# Patient Record
Sex: MALE | Race: WHITE | Employment: FULL TIME | URBAN - METROPOLITAN AREA
[De-identification: names, ages, dates, MRNs, and addresses within clinical notes are randomized per-mention and may not be internally consistent; named-entity substitution may affect disease eponyms.]

---

## 2020-09-03 ENCOUNTER — APPOINTMENT (OUTPATIENT)
Dept: CT IMAGING | Age: 30
End: 2020-09-03

## 2020-09-03 ENCOUNTER — HOSPITAL ENCOUNTER (EMERGENCY)
Age: 30
Discharge: HOME | End: 2020-09-03
Attending: EMERGENCY MEDICINE

## 2020-09-03 ENCOUNTER — APPOINTMENT (OUTPATIENT)
Dept: ULTRASOUND IMAGING | Age: 30
End: 2020-09-03

## 2020-09-03 VITALS
TEMPERATURE: 98.4 F | WEIGHT: 157 LBS | HEART RATE: 75 BPM | RESPIRATION RATE: 16 BRPM | OXYGEN SATURATION: 98 % | HEIGHT: 72 IN | SYSTOLIC BLOOD PRESSURE: 124 MMHG | DIASTOLIC BLOOD PRESSURE: 76 MMHG | BODY MASS INDEX: 21.26 KG/M2

## 2020-09-03 LAB
ALBUMIN SERPL-MCNC: 4.2 G/DL (ref 3.5–5.2)
ALP BLD-CCNC: 41 U/L (ref 40–129)
ALT SERPL-CCNC: 15 U/L (ref 0–40)
ANION GAP SERPL CALCULATED.3IONS-SCNC: 8 MMOL/L (ref 7–16)
AST SERPL-CCNC: 18 U/L (ref 0–39)
BASOPHILS ABSOLUTE: 0.08 E9/L (ref 0–0.2)
BASOPHILS RELATIVE PERCENT: 1.2 % (ref 0–2)
BILIRUB SERPL-MCNC: 0.4 MG/DL (ref 0–1.2)
BILIRUBIN URINE: NEGATIVE
BLOOD, URINE: NEGATIVE
BUN BLDV-MCNC: 20 MG/DL (ref 6–20)
CALCIUM SERPL-MCNC: 9.5 MG/DL (ref 8.6–10.2)
CHLORIDE BLD-SCNC: 100 MMOL/L (ref 98–107)
CLARITY: CLEAR
CO2: 28 MMOL/L (ref 22–29)
COLOR: YELLOW
CREAT SERPL-MCNC: 0.9 MG/DL (ref 0.7–1.2)
EOSINOPHILS ABSOLUTE: 0.15 E9/L (ref 0.05–0.5)
EOSINOPHILS RELATIVE PERCENT: 2.3 % (ref 0–6)
GFR AFRICAN AMERICAN: >60
GFR NON-AFRICAN AMERICAN: >60 ML/MIN/1.73
GLUCOSE BLD-MCNC: 96 MG/DL (ref 74–99)
GLUCOSE URINE: NEGATIVE MG/DL
HCT VFR BLD CALC: 39.4 % (ref 37–54)
HEMOGLOBIN: 13.1 G/DL (ref 12.5–16.5)
IMMATURE GRANULOCYTES #: 0.02 E9/L
IMMATURE GRANULOCYTES %: 0.3 % (ref 0–5)
KETONES, URINE: ABNORMAL MG/DL
LACTIC ACID: 0.6 MMOL/L (ref 0.5–2.2)
LEUKOCYTE ESTERASE, URINE: NEGATIVE
LIPASE: 33 U/L (ref 13–60)
LYMPHOCYTES ABSOLUTE: 1.37 E9/L (ref 1.5–4)
LYMPHOCYTES RELATIVE PERCENT: 21.3 % (ref 20–42)
MCH RBC QN AUTO: 30.3 PG (ref 26–35)
MCHC RBC AUTO-ENTMCNC: 33.2 % (ref 32–34.5)
MCV RBC AUTO: 91 FL (ref 80–99.9)
MONOCYTES ABSOLUTE: 0.47 E9/L (ref 0.1–0.95)
MONOCYTES RELATIVE PERCENT: 7.3 % (ref 2–12)
NEUTROPHILS ABSOLUTE: 4.35 E9/L (ref 1.8–7.3)
NEUTROPHILS RELATIVE PERCENT: 67.6 % (ref 43–80)
NITRITE, URINE: NEGATIVE
PDW BLD-RTO: 12.6 FL (ref 11.5–15)
PH UA: 6 (ref 5–9)
PLATELET # BLD: 144 E9/L (ref 130–450)
PMV BLD AUTO: 11.5 FL (ref 7–12)
POTASSIUM REFLEX MAGNESIUM: 4.3 MMOL/L (ref 3.5–5)
PROTEIN UA: NEGATIVE MG/DL
RBC # BLD: 4.33 E12/L (ref 3.8–5.8)
SODIUM BLD-SCNC: 136 MMOL/L (ref 132–146)
SPECIFIC GRAVITY UA: 1.02 (ref 1–1.03)
TOTAL PROTEIN: 6.7 G/DL (ref 6.4–8.3)
UROBILINOGEN, URINE: 0.2 E.U./DL
WBC # BLD: 6.4 E9/L (ref 4.5–11.5)

## 2020-09-03 PROCEDURE — 96374 THER/PROPH/DIAG INJ IV PUSH: CPT

## 2020-09-03 PROCEDURE — 80053 COMPREHEN METABOLIC PANEL: CPT

## 2020-09-03 PROCEDURE — 6360000002 HC RX W HCPCS: Performed by: EMERGENCY MEDICINE

## 2020-09-03 PROCEDURE — 83690 ASSAY OF LIPASE: CPT

## 2020-09-03 PROCEDURE — 83605 ASSAY OF LACTIC ACID: CPT

## 2020-09-03 PROCEDURE — 99284 EMERGENCY DEPT VISIT MOD MDM: CPT

## 2020-09-03 PROCEDURE — 85025 COMPLETE CBC W/AUTO DIFF WBC: CPT

## 2020-09-03 PROCEDURE — 99283 EMERGENCY DEPT VISIT LOW MDM: CPT

## 2020-09-03 PROCEDURE — 93975 VASCULAR STUDY: CPT

## 2020-09-03 PROCEDURE — 76870 US EXAM SCROTUM: CPT

## 2020-09-03 PROCEDURE — 81003 URINALYSIS AUTO W/O SCOPE: CPT

## 2020-09-03 PROCEDURE — 74177 CT ABD & PELVIS W/CONTRAST: CPT

## 2020-09-03 PROCEDURE — 6370000000 HC RX 637 (ALT 250 FOR IP): Performed by: EMERGENCY MEDICINE

## 2020-09-03 PROCEDURE — 6360000004 HC RX CONTRAST MEDICATION: Performed by: RADIOLOGY

## 2020-09-03 RX ORDER — KETOROLAC TROMETHAMINE 30 MG/ML
15 INJECTION, SOLUTION INTRAMUSCULAR; INTRAVENOUS ONCE
Status: COMPLETED | OUTPATIENT
Start: 2020-09-03 | End: 2020-09-03

## 2020-09-03 RX ORDER — HYDROCODONE BITARTRATE AND ACETAMINOPHEN 5; 325 MG/1; MG/1
1 TABLET ORAL ONCE
Status: COMPLETED | OUTPATIENT
Start: 2020-09-03 | End: 2020-09-03

## 2020-09-03 RX ADMIN — HYDROCODONE BITARTRATE AND ACETAMINOPHEN 1 TABLET: 5; 325 TABLET ORAL at 19:44

## 2020-09-03 RX ADMIN — IOPAMIDOL 110 ML: 755 INJECTION, SOLUTION INTRAVENOUS at 18:47

## 2020-09-03 RX ADMIN — KETOROLAC TROMETHAMINE 15 MG: 30 INJECTION, SOLUTION INTRAMUSCULAR at 17:44

## 2020-09-03 ASSESSMENT — PAIN DESCRIPTION - PAIN TYPE: TYPE: ACUTE PAIN

## 2020-09-03 ASSESSMENT — PAIN SCALES - GENERAL
PAINLEVEL_OUTOF10: 5
PAINLEVEL_OUTOF10: 7
PAINLEVEL_OUTOF10: 7

## 2020-09-03 ASSESSMENT — PAIN DESCRIPTION - FREQUENCY: FREQUENCY: CONTINUOUS

## 2020-09-03 ASSESSMENT — PAIN DESCRIPTION - ONSET: ONSET: SUDDEN

## 2020-09-03 ASSESSMENT — PAIN DESCRIPTION - DESCRIPTORS: DESCRIPTORS: THROBBING

## 2020-09-03 ASSESSMENT — PAIN DESCRIPTION - LOCATION: LOCATION: SCROTUM

## 2020-09-03 ASSESSMENT — PAIN - FUNCTIONAL ASSESSMENT: PAIN_FUNCTIONAL_ASSESSMENT: PREVENTS OR INTERFERES SOME ACTIVE ACTIVITIES AND ADLS

## 2020-09-03 ASSESSMENT — PAIN DESCRIPTION - ORIENTATION: ORIENTATION: RIGHT

## 2020-09-03 NOTE — ED PROVIDER NOTES
Patient is a 58-year-old male with no significant past medical history presents emergency department with testicular pain. Symptoms moderate in severity and intermittent since onset. Patient noticed pain in his right testicle starting last night. He states the pain is intermittent and comes and sharp waves. He has never had pain like this before. The pain does not radiate anywhere, is localized to his right testicle. He saw his primary care provider today and they thought he had orchitis but sent him to the ER for further evaluation. Patient denies any fevers or chills at home. Denies any nausea, vomiting, diarrhea, constipation, abdominal pain. He denies any penile discharge or penile pain or swelling. He states he does feel like his right testicle is higher than the other and he had to keep \"pulling it down\". He notes some mild RLQ abdominal pain but denying it today. Deneis fevers, chills, diarrhea, constipation. Deneis hx of STD/STI, no discharge and state not currently sexually active. Review of Systems   Constitutional: Negative for chills and fever. Eyes: Negative for pain and discharge. Respiratory: Negative for cough, shortness of breath and wheezing. Cardiovascular: Negative for chest pain. Gastrointestinal: Positive for abdominal pain (yesterday). Negative for diarrhea, nausea and vomiting. Genitourinary: Positive for testicular pain. Negative for discharge, dysuria, frequency, penile swelling, scrotal swelling and urgency. Musculoskeletal: Negative for arthralgias and back pain. Skin: Negative for rash and wound. Neurological: Negative for weakness and headaches. All other systems reviewed and are negative. Physical Exam  Vitals signs and nursing note reviewed. Constitutional:       General: He is not in acute distress. Appearance: He is well-developed. He is not ill-appearing. HENT:      Head: Normocephalic and atraumatic.    Eyes:      Pupils: Pupils are equal, round, and reactive to light. Neck:      Musculoskeletal: Normal range of motion and neck supple. Cardiovascular:      Rate and Rhythm: Normal rate and regular rhythm. Heart sounds: Normal heart sounds. No murmur. Pulmonary:      Effort: Pulmonary effort is normal. No respiratory distress. Breath sounds: Normal breath sounds. No wheezing or rales. Abdominal:      General: Abdomen is flat. Palpations: Abdomen is soft. Tenderness: There is no abdominal tenderness. There is no right CVA tenderness, left CVA tenderness, guarding or rebound. Negative signs include Blancas's sign and Rovsing's sign. Hernia: No hernia is present. There is no hernia in the left inguinal area, right femoral area, left femoral area or right inguinal area. Genitourinary:     Penis: Normal.       Scrotum/Testes: Cremasteric reflex is present. Right: Mass, tenderness or swelling not present. Left: Tenderness present. Mass or swelling not present. Comments: Left testcle midlly TTP, no masses or abscess palpable. Skin unchanged, no erythema or induration. Right testicle is elevated when compared to left. No inguinal hernia or femoral hernias palpable. No pain to spermatic cord palpation  Skin:     General: Skin is warm and dry. Capillary Refill: Capillary refill takes less than 2 seconds. Neurological:      Mental Status: He is alert and oriented to person, place, and time. Coordination: Coordination normal.          Procedures     MDM  Number of Diagnoses or Management Options  Abdominal pain, right lower quadrant:   Ileitis:   Varicocele present on ultrasound of scrotum:    Patient presented to the ED for testicle pain. History concenring for torsion. US performed  Demonstrating a prominent varicocele and small amount of scrotal fluid. No evidence of paranchymal abnormality or vascularl compromise.  There was no fourneir or skin changes concerning for cellulitis or infection. Toradol made symptoms mildly better. No penile discharge and patient stating multiple times that he is not concerned for STD and not wanting treatment for possible infection. UA with trace leuks but no infection. On repeat eval, updated about ultrasound findings. Patient pain is improved and resting comfortably in bed. He does note some mild RLQ abdominal pain now. PE with mild tenderness, no peritoneal signs. Lab work then ordered as well as CT to r/o appendicitis. Lab work unremarkable. CT scan showing ileitis, and intraluminal fluid. The appendix could not be indentified. H&P less concerning appendicitis when coupled with lab work. Consult placed to general surgery to discuss case, less concern for appendicitis and strict return precautions and monitoring of pain recommended. Also instructed the need for follow up on the ileitis as this could be 2/2 IBD. Updated patient and mother about work up and findings. Informed that appendix could not be visualized so very close monitoring of pain needs to be performed. Also informed of the ileitis and importance of follow up. As I was waiting to Dr. Danay Baker, patient left the department but mother remained. She initially stated patient was not coming back for discharge paper work so we were going to [place] his as eloped/Left AMA but provide mother with the follow up information to amena to patient. As I was printing paperwork, patient return to the department and I was able to finished discussing all of the findings and completed education. Very strict return precautions were discussed. Patient is to follow up with PCP as well as general surgery or GI if they prefer to work up the ileitis. Extensive education about the testicular pain and expressed the need to return to the ED immediately if pain worsen or new symptoms occur. Patient verbalized understanding and felt comfortable with leaving and following up.  They verbalized understanding and were agreeable with plan. Patient was discharged. ED Course as of Sep 04 1319   Thu Sep 03, 2020   1730 Reevaluated patient, he does state he does have some very mild right lower quadrant on tenderness. He has no rebound or guarding on exam.  He has mild pain to deep palpation, will pursue lab work and imaging studies. []   1959 Patient's mother has been in the department. patient's entire stay, he stated at the beginning of the visit, she was able to be in the room during the entire evaluation, he felt comfortable speaking in front of her about his medical problems. I went to reevaluate patient after speaking with Dr. Mort Essex, patient has eloped from the emergency department. Mother still in the room. Mother states patient states he is not returning back into the ER. Patient has eloped from the department. Because patient allowed mother to be part of his medical care here in the department, I will provide the mother with the paperwork of patient's discharge instructions and appropriate follow-up. []   2003 Upon walking about the room, patient had return to the emergency department. Will provide patient with follow-up and information about lab work and studies performed here in the department. []      ED Course User Index  [] Carmencita Ngo, DO          --------------------------------------------- PAST HISTORY ---------------------------------------------  Past Medical History:  has a past medical history of Tourette's syndrome. Past Surgical History:  has a past surgical history that includes Arm Surgery. Social History:  reports that he has been smoking cigarettes. He has been smoking about 1.00 pack per day. He has never used smokeless tobacco. He reports current alcohol use. He reports current drug use. Drug: Marijuana. Family History: family history is not on file. The patients home medications have been reviewed.     Allergies: Acyclovir and Ceclor [cefaclor]    -------------------------------------------------- RESULTS -------------------------------------------------  Labs:  Results for orders placed or performed during the hospital encounter of 09/03/20   Urinalysis, reflex to microscopic   Result Value Ref Range    Color, UA Yellow Straw/Yellow    Clarity, UA Clear Clear    Glucose, Ur Negative Negative mg/dL    Bilirubin Urine Negative Negative    Ketones, Urine TRACE (A) Negative mg/dL    Specific Gravity, UA 1.025 1.005 - 1.030    Blood, Urine Negative Negative    pH, UA 6.0 5.0 - 9.0    Protein, UA Negative Negative mg/dL    Urobilinogen, Urine 0.2 <2.0 E.U./dL    Nitrite, Urine Negative Negative    Leukocyte Esterase, Urine Negative Negative   CBC Auto Differential   Result Value Ref Range    WBC 6.4 4.5 - 11.5 E9/L    RBC 4.33 3.80 - 5.80 E12/L    Hemoglobin 13.1 12.5 - 16.5 g/dL    Hematocrit 39.4 37.0 - 54.0 %    MCV 91.0 80.0 - 99.9 fL    MCH 30.3 26.0 - 35.0 pg    MCHC 33.2 32.0 - 34.5 %    RDW 12.6 11.5 - 15.0 fL    Platelets 953 358 - 884 E9/L    MPV 11.5 7.0 - 12.0 fL    Neutrophils % 67.6 43.0 - 80.0 %    Immature Granulocytes % 0.3 0.0 - 5.0 %    Lymphocytes % 21.3 20.0 - 42.0 %    Monocytes % 7.3 2.0 - 12.0 %    Eosinophils % 2.3 0.0 - 6.0 %    Basophils % 1.2 0.0 - 2.0 %    Neutrophils Absolute 4.35 1.80 - 7.30 E9/L    Immature Granulocytes # 0.02 E9/L    Lymphocytes Absolute 1.37 (L) 1.50 - 4.00 E9/L    Monocytes Absolute 0.47 0.10 - 0.95 E9/L    Eosinophils Absolute 0.15 0.05 - 0.50 E9/L    Basophils Absolute 0.08 0.00 - 0.20 E9/L   Comprehensive Metabolic Panel w/ Reflex to MG   Result Value Ref Range    Sodium 136 132 - 146 mmol/L    Potassium reflex Magnesium 4.3 3.5 - 5.0 mmol/L    Chloride 100 98 - 107 mmol/L    CO2 28 22 - 29 mmol/L    Anion Gap 8 7 - 16 mmol/L    Glucose 96 74 - 99 mg/dL    BUN 20 6 - 20 mg/dL    CREATININE 0.9 0.7 - 1.2 mg/dL    GFR Non-African American >60 >=60 mL/min/1.73    GFR African American >60     Calcium 9.5 8.6 - 10.2 mg/dL    Total Protein 6.7 6.4 - 8.3 g/dL    Alb 4.2 3.5 - 5.2 g/dL    Total Bilirubin 0.4 0.0 - 1.2 mg/dL    Alkaline Phosphatase 41 40 - 129 U/L    ALT 15 0 - 40 U/L    AST 18 0 - 39 U/L   Lipase   Result Value Ref Range    Lipase 33 13 - 60 U/L   Lactic Acid, Plasma   Result Value Ref Range    Lactic Acid 0.6 0.5 - 2.2 mmol/L       Radiology:  CT ABDOMEN PELVIS W IV CONTRAST Additional Contrast? None   Final Result   There is distal small bowel with intraluminal fluid in the pelvic and   right lower quadrant small bowel distribution, suggesting ileitis, but   the appearance is nonspecific and is not associated with extraluminal   inflammatory findings. The colon is minimally distended with fecal   content. The appendix is not identified, and early or mild appendicitis could   be missed. US DUP ABD PEL RETRO SCROT COMPLETE   Final Result   Prominent varicocele changes bilaterally and small amounts of scrotal   fluid bilaterally. No evidence of testicular parenchymal abnormality or vascular   compromise. US SCROTUM AND TESTICLES   Final Result   Prominent varicocele changes bilaterally and small amounts of scrotal   fluid bilaterally. No evidence of testicular parenchymal abnormality or vascular   compromise. Consultation:  I Spoke with Dr. Desiree Robb (Surgery). Discussed case. They will provide consultation.      ------------------------- NURSING NOTES AND VITALS REVIEWED ---------------------------  Date / Time Roomed:  9/3/2020  3:20 PM  ED Bed Assignment:  08/08    The nursing notes within the ED encounter and vital signs as below have been reviewed.    /76   Pulse 75   Temp 98.4 °F (36.9 °C) (Oral)   Resp 16   Ht 6' (1.829 m)   Wt 157 lb (71.2 kg)   SpO2 98%   BMI 21.29 kg/m²   Oxygen Saturation Interpretation: Normal      ------------------------------------------ PROGRESS NOTES ------------------------------------------  ED COURSE MEDICATIONS:                Medications   ketorolac (TORADOL) injection 15 mg (15 mg Intravenous Given 9/3/20 1744)   iopamidol (ISOVUE-370) 76 % injection 110 mL (110 mLs Intravenous Given 9/3/20 1847)   HYDROcodone-acetaminophen (NORCO) 5-325 MG per tablet 1 tablet (1 tablet Oral Given 9/3/20 1944)       I have spoken with the patient and mother and discussed todays results, in addition to providing specific details for the plan of care and counseling regarding the diagnosis and prognosis. Their questions are answered at this time and they are agreeable with the plan. I discussed at length with them reasons for immediate return here for re evaluation. They will followup with primary care by calling their office tomorrow. --------------------------------- ADDITIONAL PROVIDER NOTES ---------------------------------  At this time the patient is without objective evidence of an acute process requiring hospitalization or inpatient management. They have remained hemodynamically stable throughout their entire ED visit and are stable for discharge with outpatient follow-up. The plan has been discussed in detail and they are aware of the specific conditions for emergent return, as well as the importance of follow-up. Discharge Medication List as of 9/3/2020  8:03 PM          Diagnosis:  1. Varicocele present on ultrasound of scrotum    2. Ileitis    3. Abdominal pain, right lower quadrant        Disposition:  Patient's disposition: Discharge to home  Patient's condition is stable.        Guillermo Ngo,   Resident  09/04/20 2371

## 2020-09-04 ENCOUNTER — APPOINTMENT (OUTPATIENT)
Dept: ULTRASOUND IMAGING | Age: 30
End: 2020-09-04

## 2020-09-04 ENCOUNTER — HOSPITAL ENCOUNTER (EMERGENCY)
Age: 30
Discharge: HOME | End: 2020-09-04
Attending: EMERGENCY MEDICINE

## 2020-09-04 VITALS
TEMPERATURE: 97.1 F | HEART RATE: 81 BPM | HEIGHT: 72 IN | DIASTOLIC BLOOD PRESSURE: 77 MMHG | BODY MASS INDEX: 21.26 KG/M2 | OXYGEN SATURATION: 97 % | WEIGHT: 157 LBS | SYSTOLIC BLOOD PRESSURE: 139 MMHG | RESPIRATION RATE: 16 BRPM

## 2020-09-04 LAB
ALBUMIN SERPL-MCNC: 4.4 G/DL (ref 3.5–5.2)
ALP BLD-CCNC: 41 U/L (ref 40–129)
ALT SERPL-CCNC: 14 U/L (ref 0–40)
ANION GAP SERPL CALCULATED.3IONS-SCNC: 7 MMOL/L (ref 7–16)
AST SERPL-CCNC: 17 U/L (ref 0–39)
BASOPHILS ABSOLUTE: 0.06 E9/L (ref 0–0.2)
BASOPHILS RELATIVE PERCENT: 1.3 % (ref 0–2)
BILIRUB SERPL-MCNC: 0.6 MG/DL (ref 0–1.2)
BILIRUBIN URINE: ABNORMAL
BLOOD, URINE: NEGATIVE
BUN BLDV-MCNC: 19 MG/DL (ref 6–20)
CALCIUM SERPL-MCNC: 9.9 MG/DL (ref 8.6–10.2)
CHLORIDE BLD-SCNC: 107 MMOL/L (ref 98–107)
CLARITY: CLEAR
CO2: 28 MMOL/L (ref 22–29)
COLOR: YELLOW
CREAT SERPL-MCNC: 0.9 MG/DL (ref 0.7–1.2)
EOSINOPHILS ABSOLUTE: 0.17 E9/L (ref 0.05–0.5)
EOSINOPHILS RELATIVE PERCENT: 3.7 % (ref 0–6)
GFR AFRICAN AMERICAN: >60
GFR NON-AFRICAN AMERICAN: >60 ML/MIN/1.73
GLUCOSE BLD-MCNC: 91 MG/DL (ref 74–99)
GLUCOSE URINE: NEGATIVE MG/DL
HCT VFR BLD CALC: 41.2 % (ref 37–54)
HEMOGLOBIN: 13.8 G/DL (ref 12.5–16.5)
IMMATURE GRANULOCYTES #: 0.01 E9/L
IMMATURE GRANULOCYTES %: 0.2 % (ref 0–5)
KETONES, URINE: 40 MG/DL
LEUKOCYTE ESTERASE, URINE: NEGATIVE
LIPASE: 25 U/L (ref 13–60)
LYMPHOCYTES ABSOLUTE: 1.01 E9/L (ref 1.5–4)
LYMPHOCYTES RELATIVE PERCENT: 22.2 % (ref 20–42)
MCH RBC QN AUTO: 30.8 PG (ref 26–35)
MCHC RBC AUTO-ENTMCNC: 33.5 % (ref 32–34.5)
MCV RBC AUTO: 92 FL (ref 80–99.9)
MONOCYTES ABSOLUTE: 0.33 E9/L (ref 0.1–0.95)
MONOCYTES RELATIVE PERCENT: 7.3 % (ref 2–12)
NEUTROPHILS ABSOLUTE: 2.97 E9/L (ref 1.8–7.3)
NEUTROPHILS RELATIVE PERCENT: 65.3 % (ref 43–80)
NITRITE, URINE: NEGATIVE
PDW BLD-RTO: 12.7 FL (ref 11.5–15)
PH UA: 6 (ref 5–9)
PLATELET # BLD: 138 E9/L (ref 130–450)
PMV BLD AUTO: 11.6 FL (ref 7–12)
POTASSIUM REFLEX MAGNESIUM: 5.3 MMOL/L (ref 3.5–5)
PROTEIN UA: NEGATIVE MG/DL
RBC # BLD: 4.48 E12/L (ref 3.8–5.8)
SODIUM BLD-SCNC: 142 MMOL/L (ref 132–146)
SPECIFIC GRAVITY UA: 1.02 (ref 1–1.03)
TOTAL PROTEIN: 7.2 G/DL (ref 6.4–8.3)
UROBILINOGEN, URINE: 0.2 E.U./DL
WBC # BLD: 4.6 E9/L (ref 4.5–11.5)

## 2020-09-04 PROCEDURE — 6360000002 HC RX W HCPCS: Performed by: NURSE PRACTITIONER

## 2020-09-04 PROCEDURE — 87591 N.GONORRHOEAE DNA AMP PROB: CPT

## 2020-09-04 PROCEDURE — 80053 COMPREHEN METABOLIC PANEL: CPT

## 2020-09-04 PROCEDURE — 85025 COMPLETE CBC W/AUTO DIFF WBC: CPT

## 2020-09-04 PROCEDURE — 76870 US EXAM SCROTUM: CPT

## 2020-09-04 PROCEDURE — 93975 VASCULAR STUDY: CPT

## 2020-09-04 PROCEDURE — 96374 THER/PROPH/DIAG INJ IV PUSH: CPT

## 2020-09-04 PROCEDURE — 87491 CHLMYD TRACH DNA AMP PROBE: CPT

## 2020-09-04 PROCEDURE — 83690 ASSAY OF LIPASE: CPT

## 2020-09-04 PROCEDURE — 2580000003 HC RX 258: Performed by: NURSE PRACTITIONER

## 2020-09-04 PROCEDURE — 96375 TX/PRO/DX INJ NEW DRUG ADDON: CPT

## 2020-09-04 PROCEDURE — 81003 URINALYSIS AUTO W/O SCOPE: CPT

## 2020-09-04 PROCEDURE — 99284 EMERGENCY DEPT VISIT MOD MDM: CPT

## 2020-09-04 RX ORDER — METHYLPREDNISOLONE 4 MG/1
TABLET ORAL
Qty: 1 KIT | Refills: 0 | Status: SHIPPED | OUTPATIENT
Start: 2020-09-04

## 2020-09-04 RX ORDER — 0.9 % SODIUM CHLORIDE 0.9 %
1000 INTRAVENOUS SOLUTION INTRAVENOUS ONCE
Status: COMPLETED | OUTPATIENT
Start: 2020-09-04 | End: 2020-09-04

## 2020-09-04 RX ORDER — HYDROCODONE BITARTRATE AND ACETAMINOPHEN 5; 325 MG/1; MG/1
1 TABLET ORAL EVERY 6 HOURS PRN
Qty: 10 TABLET | Refills: 0 | Status: SHIPPED | OUTPATIENT
Start: 2020-09-04 | End: 2020-09-07

## 2020-09-04 RX ORDER — SULFAMETHOXAZOLE AND TRIMETHOPRIM 800; 160 MG/1; MG/1
1 TABLET ORAL 2 TIMES DAILY
Qty: 14 TABLET | Refills: 0 | Status: SHIPPED | OUTPATIENT
Start: 2020-09-04 | End: 2020-09-11

## 2020-09-04 RX ORDER — MORPHINE SULFATE 4 MG/ML
4 INJECTION, SOLUTION INTRAMUSCULAR; INTRAVENOUS ONCE
Status: COMPLETED | OUTPATIENT
Start: 2020-09-04 | End: 2020-09-04

## 2020-09-04 RX ORDER — ONDANSETRON 2 MG/ML
4 INJECTION INTRAMUSCULAR; INTRAVENOUS ONCE
Status: COMPLETED | OUTPATIENT
Start: 2020-09-04 | End: 2020-09-04

## 2020-09-04 RX ADMIN — MORPHINE SULFATE 4 MG: 4 INJECTION, SOLUTION INTRAMUSCULAR; INTRAVENOUS at 10:13

## 2020-09-04 RX ADMIN — ONDANSETRON 4 MG: 2 INJECTION INTRAMUSCULAR; INTRAVENOUS at 10:13

## 2020-09-04 RX ADMIN — SODIUM CHLORIDE 1000 ML: 9 INJECTION, SOLUTION INTRAVENOUS at 10:09

## 2020-09-04 ASSESSMENT — ENCOUNTER SYMPTOMS
SHORTNESS OF BREATH: 0
WHEEZING: 0
EYE DISCHARGE: 0
DIARRHEA: 0
VOMITING: 0
BACK PAIN: 0
ABDOMINAL PAIN: 1
EYE PAIN: 0
NAUSEA: 0
COUGH: 0

## 2020-09-04 ASSESSMENT — PAIN DESCRIPTION - PAIN TYPE
TYPE: ACUTE PAIN
TYPE: ACUTE PAIN

## 2020-09-04 ASSESSMENT — PAIN DESCRIPTION - ORIENTATION: ORIENTATION: RIGHT

## 2020-09-04 ASSESSMENT — PAIN SCALES - GENERAL
PAINLEVEL_OUTOF10: 8
PAINLEVEL_OUTOF10: 10
PAINLEVEL_OUTOF10: 7

## 2020-09-04 ASSESSMENT — PAIN DESCRIPTION - FREQUENCY: FREQUENCY: CONTINUOUS

## 2020-09-04 ASSESSMENT — PAIN DESCRIPTION - DESCRIPTORS: DESCRIPTORS: TIGHTNESS;SQUEEZING

## 2020-09-04 ASSESSMENT — PAIN DESCRIPTION - LOCATION
LOCATION: OTHER (COMMENT)
LOCATION: SCROTUM

## 2020-09-04 NOTE — ED PROVIDER NOTES
ED Attending  CC: Lisa       Department of Emergency Medicine   ED  Provider Note  Admit Date/RoomTime: 9/4/2020  9:13 AM  ED Room: 21/21  Chief Complaint     Groin Pain (right testicle pain, seen yesterday and is worse today. )    History of Present Illness   Source of history provided by:  patient. History/Exam Limitations: none. Brad Hughes is a 31 y.o. old male with a past medical history of:   Past Medical History:   Diagnosis Date    Tourette's syndrome       presenting to the emergency department by private vehicle, ambulatory and accompanied by family member mother, for Right scrotal pain , which occured 2 day(s) prior to arrival.  Since exposure/onset the symptoms have been persistent and worsening and moderate to severe in severity. He has associated symptoms of abdominal pain and chills. He denies any back pain, cloudy urine, constipation, diarrhea, dysuria, headache, hematuria, penile discharge, sweating, urinary frequency, urinary incontinence, urinary urgency or vomiting. There has been history of similar episodes of pain. Patient was seen yesterday for same complaint. He was discharged home with follow-up with his PCP this morning and general surgeon. He states pain has increased and is intolerable. He states the pain is rating up into his right lower quadrant. He denies any shortness breath, chest pain, nausea, vomiting. No history of recent trauma . STD history: none. He denies any concern for any STDs. He denies any penile discharge, scrotum masses, penile rash or ulcers. He denies any diarrhea constipation. He states last bowel movement was this morning. He denies any blood in his stool. ROS    Pertinent positives and negatives are stated within HPI, all other systems reviewed and are negative. Past Surgical History:   Procedure Laterality Date    ARM SURGERY     Social History:  reports that he has been smoking cigarettes.  He has been smoking about 1.00 pack per day. He has never used smokeless tobacco. He reports current alcohol use. He reports current drug use. Drug: Marijuana. Family History: family history is not on file. Allergies: Acyclovir and Ceclor [cefaclor]    Physical Exam           ED Triage Vitals [09/04/20 0918]   BP Temp Temp src Pulse Resp SpO2 Height Weight   139/77 97.1 °F (36.2 °C) -- 81 16 97 % 6' (1.829 m) 157 lb (71.2 kg)      Oxygen Saturation Interpretation: Normal.    Constitutional:  Alert, development consistent with age. Eyes:  PERRL, EOMI, no discharge or conjunctival injection. Ears:  External ears without lesions. Throat:  Pharynx without injection, exudate, or tonsillar hypertrophy. Airway patient. Neck:  Normal ROM. Supple. Respiratory:  Clear to auscultation and breath sounds equal.  CV:  Regular rate and rhythm, normal heart sounds, without pathological murmurs, ectopy, gallops, or rubs. GI:  General Appearance: normal.       Bowel sounds: normal bowel sounds. Distension:  None. Tenderness: mild tenderness is present in the RLQ, no rebound tenderness, no guarding. Liver: non-tender. Spleen:  non-tender. Pulsatile Mass: absent. Hernia:  no inguinal or femoral hernias noted. :        Penis: non focal circumcised. Scrotum: edema: right. Testicle: swelling on right, tenderness of right. Integument:  Normal turgor. Warm, dry, without visible rash, unless noted elsewhere. Neurological:  Orientation age-appropriate. Motor functions intact.     Lab / Imaging Results   (All laboratory and radiology results have been personally reviewed by myself)  Labs:  Results for orders placed or performed during the hospital encounter of 09/04/20   C.trachomatis N.gonorrhoeae DNA, Urine    Specimen: Urine   Result Value Ref Range    Source Urine    CBC Auto Differential   Result Value Ref Range    WBC 4.6 4.5 - 11.5 E9/L    RBC 4.48 3.80 - 5.80 E12/L    Hemoglobin 13.8 12.5 - 16.5 g/dL    Hematocrit 41.2 37.0 - 54.0 %    MCV 92.0 80.0 - 99.9 fL    MCH 30.8 26.0 - 35.0 pg    MCHC 33.5 32.0 - 34.5 %    RDW 12.7 11.5 - 15.0 fL    Platelets 431 852 - 895 E9/L    MPV 11.6 7.0 - 12.0 fL    Neutrophils % 65.3 43.0 - 80.0 %    Immature Granulocytes % 0.2 0.0 - 5.0 %    Lymphocytes % 22.2 20.0 - 42.0 %    Monocytes % 7.3 2.0 - 12.0 %    Eosinophils % 3.7 0.0 - 6.0 %    Basophils % 1.3 0.0 - 2.0 %    Neutrophils Absolute 2.97 1.80 - 7.30 E9/L    Immature Granulocytes # 0.01 E9/L    Lymphocytes Absolute 1.01 (L) 1.50 - 4.00 E9/L    Monocytes Absolute 0.33 0.10 - 0.95 E9/L    Eosinophils Absolute 0.17 0.05 - 0.50 E9/L    Basophils Absolute 0.06 0.00 - 0.20 E9/L   Comprehensive Metabolic Panel w/ Reflex to MG   Result Value Ref Range    Sodium 142 132 - 146 mmol/L    Potassium reflex Magnesium 5.3 (H) 3.5 - 5.0 mmol/L    Chloride 107 98 - 107 mmol/L    CO2 28 22 - 29 mmol/L    Anion Gap 7 7 - 16 mmol/L    Glucose 91 74 - 99 mg/dL    BUN 19 6 - 20 mg/dL    CREATININE 0.9 0.7 - 1.2 mg/dL    GFR Non-African American >60 >=60 mL/min/1.73    GFR African American >60     Calcium 9.9 8.6 - 10.2 mg/dL    Total Protein 7.2 6.4 - 8.3 g/dL    Alb 4.4 3.5 - 5.2 g/dL    Total Bilirubin 0.6 0.0 - 1.2 mg/dL    Alkaline Phosphatase 41 40 - 129 U/L    ALT 14 0 - 40 U/L    AST 17 0 - 39 U/L   Lipase   Result Value Ref Range    Lipase 25 13 - 60 U/L   Urinalysis, reflex to microscopic   Result Value Ref Range    Color, UA Yellow Straw/Yellow    Clarity, UA Clear Clear    Glucose, Ur Negative Negative mg/dL    Bilirubin Urine SMALL (A) Negative    Ketones, Urine 40 (A) Negative mg/dL    Specific Gravity, UA 1.025 1.005 - 1.030    Blood, Urine Negative Negative    pH, UA 6.0 5.0 - 9.0    Protein, UA Negative Negative mg/dL    Urobilinogen, Urine 0.2 <2.0 E.U./dL    Nitrite, Urine Negative Negative    Leukocyte Esterase, Urine Negative Negative     Imaging:   All Radiology results interpreted by Radiologist unless otherwise noted. US DUP ABD PEL RETRO SCROT COMPLETE   Final Result      1. No mass or torsion. 2. No evidence of acute epididymitis. 3. Trace right hydrocele decreased from yesterday. 4. A chronic left varicocele. US SCROTUM AND TESTICLES   Final Result      1. No mass or torsion. 2. No evidence of acute epididymitis. 3. Trace right hydrocele decreased from yesterday. 4. A chronic left varicocele. ED Course / Medical Decision Making     Medications   0.9 % sodium chloride bolus (1,000 mLs Intravenous New Bag 9/4/20 1009)   morphine sulfate (PF) injection 4 mg (4 mg Intravenous Given 9/4/20 1013)   ondansetron (ZOFRAN) injection 4 mg (4 mg Intravenous Given 9/4/20 1013)        Re-examination:  9/4/20       Time: 1136-reevaluated patient. Patient states his pain is decreased from laying her in the morning. Instructed him that urology will be coming down to further evaluate patient. Patient is in no distress at this time     Patients symptoms are improving. Consults:   IP CONSULT TO UROLOGY  1108- Spoke with Shruthi Garcia NP for  Urology reviewed results with her. She states her attending Dr. Israel Mancia will be down in the emergency department to further evaluate patient. 1218-Dr. King in Emergency Department to evaluated patient. Give pt Septra DS bid for a week, Medrol dospak, And Norco 5/325  # 10 tablets and follow up with him in two weeks    Procedures:   none    MDM:   Patient presents to the ED for right testicular pain. Differential diagnoses included but not limited to torsion versus  versus hydrocele. Workup in the ED revealed CBC and CMP were unremarkable. Lipase was normal at 25. Urinalysis was unremarkable. GC cultures pending at this time. Ultrasound of the scrotum and abdominal pelvic retro-scrotum complete revealed no mass or torsion. There is no evidence of acute epididymitis.   There was trace right hydrocele which is decreasing yesterday. Of the left chronic variceal.  Upon examination patient had high rising of the right testicle. There was no masses felt. There was swelling and tenderness upon palpation. Patient denies any fevers. He denies any penile discharge. Urology at bedside for consult. He recommends patient discharged home with Septra DS, Medrol Dosepak and Norco for pain. He instructed patient to not lift anything heavy. He feels this could have had torsion and detorsion. And he advised patient if persistent pain he can have elective orchidopexy. He would like him to follow-up in his office within 2 weeks unless needed to seen earlier. Patient was given morphine, Zofran and IV fluids for their symptoms with moderate improvement. Patient continues to be non-toxic on re-evaluation. Findings were discussed with the patient and reasons to immediately return to the ED were articulated to them. They will follow-up with their PMD and urology. Scripts were given to patient for Norco, Septra DS and Medrol Dosepak. Discussed potential side effects of starting these medications. This includes Norco can cause increased sedation and addiction. Patient states verbal understanding. [place] was reviewed and no signs of drug abuse at this time. Instructed patient do not drive at discharge due to receiving pain medication. Patient's father at bedside for discharge ride. Counseling: The emergency provider has spoken with the patient and discussed todays results, in addition to providing specific details for the plan of care and counseling regarding the diagnosis and prognosis. Questions are answered at this time and they are agreeable with the plan. Assessment     1. Hydrocele, right    2.  Scrotum pain      Plan   Discharge to home  Patient condition is stable    New Medications     New Prescriptions    No medications on file     Electronically signed by CALIXTO Jasmine - TYRA   DD: 9/4/20  **This report was transcribed using voice recognition software. Every effort was made to ensure accuracy; however, inadvertent computerized transcription errors may be present.   END OF ED PROVIDER NOTE       Daryle Bertin, CALIXTO - CNP  09/04/20 2752

## 2020-09-04 NOTE — CONSULTS
9/4/2020 11:51 AM  Service: Urology  Group: [email or web address] ([facility])    Horacio Vanegas  [id number]     Chief Complaint:  Severe right testicular pain    History of Present Illness: The patient is a 31 y.o. male patient who presents with severe right testicular pain  The patient presented yesterday with severe pain he was thought to have had orchitis came to the emergency room was assessed and no serious findings were found other than testicular pain there was no sign of right-sided testicular tumor epididymitis orchitis serious hydrocele varicocele or inguinal hernia  He has no voiding symptoms no frequency no urgency no gross hematuria no sensation of retention. No history of urinary tract infection no history of calculus disease. He was treated symptomatically yesterday with Naprosyn . he returns today with persistence of the right inguinal discomfort a scrotal ultrasound was repeated and there is no sign of right-sided torsion orchitis epididymitis serious varicocele or hydrocele  Patient was screened for trichomonas and gonorrhea the DNA testing was negative. No urethral dischargefather had torsion age 13 according to pt history  Grandfather had a testes removed  Pt with testes retracted to ing area two days ago no redness or swelling  No Trauma/no fever      Past Medical History:   Diagnosis Date    Tourette's syndrome          Past Surgical History:   Procedure Laterality Date    ARM SURGERY         Medications Prior to Admission:    Not in a hospital admission. Allergies:    Acyclovir and Ceclor [cefaclor]    Social History:    reports that he has been smoking cigarettes. He has been smoking about 1.00 pack per day. He has never used smokeless tobacco. He reports current alcohol use. He reports current drug use. Drug: Marijuana.   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 Review of Systems:  Respiratory: negative for cough and hemoptysis  Cardiovascular: negative for chest pain and dyspnea  Gastrointestinal: negative for abdominal pain, diarrhea, nausea and vomiting  Derm: negative for rash and skin lesion(s)  Neurological: negative for seizures and tremors Tourette's sndrome  Endocrine: negative for diabetic symptoms including polydipsia and polyuria  : As above in the HPI, otherwise negative    Musculoskeletal; he fractured his hand several years ago after he punched a throw door this is by chart review    Physical Exam:     Vitals:  /77   Pulse 81   Temp 97.1 °F (36.2 °C)   Resp 16   Ht 6' (1.829 m)   Wt 157 lb (71.2 kg)   SpO2 97%   BMI 21.29 kg/m²     General:  Awake, alert, oriented X 3. Well developed, well nourished, well groomed. No apparent distress. HEENT:  Normocephalic, atraumatic. Pupils equal, round. No scleral icterus. No conjunctival injection. Normal lips, teeth, and gums. No nasal discharge. Neck:  Supple, no masses. Heart:  RRR  Lungs:  No audible wheezing. Respirations symmetric and non-labored. Abdomen:  soft, nontender, no masses, no organomegaly, no peritoneal signs. No inguinal hernia with or without valsalva. Non fixed inguinal nodes right not enlarged  Extremities:  No clubbing, cyanosis, or edema  Skin:  Warm and dry, no open lesions or rashes  Neuro:  There are no motor or sensory deficits in the 4 quadrant extremities   Rectal: deferred  Genitalia:  Allodynia right no obvious hydrocoeles or varicoeles clinically    Labs:     Recent Labs     09/03/20 1740 09/04/20  1002   WBC 6.4 4.6   RBC 4.33 4.48   HGB 13.1 13.8   HCT 39.4 41.2   MCV 91.0 92.0   MCH 30.3 30.8   MCHC 33.2 33.5   RDW 12.6 12.7    138   MPV 11.5 11.6         Recent Labs     09/03/20 1740 09/04/20  1002   CREATININE 0.9 0.9         Assessment:  Jamila Frausto 31 y.o. male     Could have had torsion and detorsion  Advised if persistent pain could have elective orchidopexy    Plan:    Discussed with Dr Km Alcantar pt Sarath DS bid for a week  Medrol dospak  And Norco 5/325  # 10  Se me in two weeks    Electronically signed by Denys Teixeira MD on 9/4/2020 at 11:51 AM

## 2020-09-11 LAB
C. TRACHOMATIS DNA ,URINE: NEGATIVE
N. GONORRHOEAE DNA, URINE: NEGATIVE
SOURCE: NORMAL